# Patient Record
Sex: FEMALE | Race: WHITE | HISPANIC OR LATINO | Employment: UNEMPLOYED | URBAN - METROPOLITAN AREA
[De-identification: names, ages, dates, MRNs, and addresses within clinical notes are randomized per-mention and may not be internally consistent; named-entity substitution may affect disease eponyms.]

---

## 2024-06-28 ENCOUNTER — APPOINTMENT (OUTPATIENT)
Dept: ULTRASOUND IMAGING | Facility: HOSPITAL | Age: 39
End: 2024-06-28

## 2024-06-28 ENCOUNTER — HOSPITAL ENCOUNTER (EMERGENCY)
Facility: HOSPITAL | Age: 39
Discharge: HOME | End: 2024-06-28

## 2024-06-28 VITALS
BODY MASS INDEX: 31.41 KG/M2 | HEART RATE: 76 BPM | OXYGEN SATURATION: 97 % | SYSTOLIC BLOOD PRESSURE: 115 MMHG | RESPIRATION RATE: 18 BRPM | HEIGHT: 68 IN | TEMPERATURE: 97.7 F | DIASTOLIC BLOOD PRESSURE: 76 MMHG | WEIGHT: 207.23 LBS

## 2024-06-28 DIAGNOSIS — N93.9 VAGINAL BLEEDING: ICD-10-CM

## 2024-06-28 DIAGNOSIS — R73.9 HYPERGLYCEMIA: ICD-10-CM

## 2024-06-28 DIAGNOSIS — R10.9 ABDOMINAL CRAMPING: ICD-10-CM

## 2024-06-28 DIAGNOSIS — O03.9 MISCARRIAGE: Primary | ICD-10-CM

## 2024-06-28 LAB
ABO GROUP BLD: NORMAL
ALBUMIN SERPL-MCNC: 4 G/DL (ref 3.5–5.2)
ALBUMIN/GLOB SERPL: 1.3 G/DL
ALP SERPL-CCNC: 105 U/L (ref 39–117)
ALT SERPL W P-5'-P-CCNC: 19 U/L (ref 1–33)
ANION GAP SERPL CALCULATED.3IONS-SCNC: 11.1 MMOL/L (ref 5–15)
AST SERPL-CCNC: 24 U/L (ref 1–32)
BASOPHILS # BLD AUTO: 0.05 10*3/MM3 (ref 0–0.2)
BASOPHILS NFR BLD AUTO: 0.4 % (ref 0–1.5)
BILIRUB SERPL-MCNC: 0.2 MG/DL (ref 0–1.2)
BUN SERPL-MCNC: 10 MG/DL (ref 6–20)
BUN/CREAT SERPL: 14.1 (ref 7–25)
CALCIUM SPEC-SCNC: 9 MG/DL (ref 8.6–10.5)
CHLORIDE SERPL-SCNC: 102 MMOL/L (ref 98–107)
CO2 SERPL-SCNC: 21.9 MMOL/L (ref 22–29)
CREAT SERPL-MCNC: 0.71 MG/DL (ref 0.57–1)
DEPRECATED RDW RBC AUTO: 43.3 FL (ref 37–54)
EGFRCR SERPLBLD CKD-EPI 2021: 111.1 ML/MIN/1.73
EOSINOPHIL # BLD AUTO: 0.04 10*3/MM3 (ref 0–0.4)
EOSINOPHIL NFR BLD AUTO: 0.3 % (ref 0.3–6.2)
ERYTHROCYTE [DISTWIDTH] IN BLOOD BY AUTOMATED COUNT: 17.2 % (ref 12.3–15.4)
GLOBULIN UR ELPH-MCNC: 3.1 GM/DL
GLUCOSE SERPL-MCNC: 262 MG/DL (ref 65–99)
HCG INTACT+B SERPL-ACNC: 61.9 MIU/ML
HCT VFR BLD AUTO: 35.4 % (ref 34–46.6)
HGB BLD-MCNC: 10.4 G/DL (ref 12–15.9)
IMM GRANULOCYTES # BLD AUTO: 0.08 10*3/MM3 (ref 0–0.05)
IMM GRANULOCYTES NFR BLD AUTO: 0.6 % (ref 0–0.5)
LYMPHOCYTES # BLD AUTO: 2.85 10*3/MM3 (ref 0.7–3.1)
LYMPHOCYTES NFR BLD AUTO: 22.1 % (ref 19.6–45.3)
MCH RBC QN AUTO: 21.1 PG (ref 26.6–33)
MCHC RBC AUTO-ENTMCNC: 29.4 G/DL (ref 31.5–35.7)
MCV RBC AUTO: 71.7 FL (ref 79–97)
MONOCYTES # BLD AUTO: 0.65 10*3/MM3 (ref 0.1–0.9)
MONOCYTES NFR BLD AUTO: 5 % (ref 5–12)
NEUTROPHILS NFR BLD AUTO: 71.6 % (ref 42.7–76)
NEUTROPHILS NFR BLD AUTO: 9.25 10*3/MM3 (ref 1.7–7)
NRBC BLD AUTO-RTO: 0 /100 WBC (ref 0–0.2)
NUMBER OF DOSES: NORMAL
PLATELET # BLD AUTO: 283 10*3/MM3 (ref 140–450)
PMV BLD AUTO: 9.8 FL (ref 6–12)
POTASSIUM SERPL-SCNC: 4.1 MMOL/L (ref 3.5–5.2)
PROT SERPL-MCNC: 7.1 G/DL (ref 6–8.5)
RBC # BLD AUTO: 4.94 10*6/MM3 (ref 3.77–5.28)
RH BLD: POSITIVE
SODIUM SERPL-SCNC: 135 MMOL/L (ref 136–145)
WBC NRBC COR # BLD AUTO: 12.92 10*3/MM3 (ref 3.4–10.8)

## 2024-06-28 PROCEDURE — 84702 CHORIONIC GONADOTROPIN TEST: CPT

## 2024-06-28 PROCEDURE — 76801 OB US < 14 WKS SINGLE FETUS: CPT

## 2024-06-28 PROCEDURE — 86901 BLOOD TYPING SEROLOGIC RH(D): CPT

## 2024-06-28 PROCEDURE — 93976 VASCULAR STUDY: CPT

## 2024-06-28 PROCEDURE — 93005 ELECTROCARDIOGRAM TRACING: CPT

## 2024-06-28 PROCEDURE — 80053 COMPREHEN METABOLIC PANEL: CPT

## 2024-06-28 PROCEDURE — 85025 COMPLETE CBC W/AUTO DIFF WBC: CPT

## 2024-06-28 PROCEDURE — 76817 TRANSVAGINAL US OBSTETRIC: CPT

## 2024-06-28 PROCEDURE — 86900 BLOOD TYPING SEROLOGIC ABO: CPT

## 2024-06-28 PROCEDURE — 99284 EMERGENCY DEPT VISIT MOD MDM: CPT

## 2024-06-28 RX ORDER — SODIUM CHLORIDE 0.9 % (FLUSH) 0.9 %
10 SYRINGE (ML) INJECTION AS NEEDED
Status: DISCONTINUED | OUTPATIENT
Start: 2024-06-28 | End: 2024-06-29 | Stop reason: HOSPADM

## 2024-06-28 NOTE — ED PROVIDER NOTES
Subjective   History of Present Illness  39-year-old female presents to ED with complaints of vaginal bleeding since Monday, states she is pregnant and is about 8 weeks along.  Last menstrual period being 4/22/2024.  She has 2 other children, 20 and 18 years of age.  Denies any difficulty with those pregnancies, was vaginal births in [place].  Unsure what her blood type is.  Does not have an OB or primary care here as she just recently moved here 4 months ago.  Denies chest pain, shortness of breath, dizziness.        Review of Systems   Gastrointestinal:  Positive for abdominal pain.   Genitourinary:  Positive for vaginal bleeding.       No past medical history on file.    No Known Allergies    No past surgical history on file.    No family history on file.    Social History     Socioeconomic History    Marital status:            Objective   Physical Exam  Vitals and nursing note reviewed. Exam conducted with a chaperone present.   Constitutional:       Appearance: Normal appearance.   HENT:      Head: Normocephalic and atraumatic.   Eyes:      Extraocular Movements: Extraocular movements intact.      Conjunctiva/sclera: Conjunctivae normal.   Cardiovascular:      Rate and Rhythm: Normal rate and regular rhythm.      Pulses: Normal pulses.      Heart sounds: Normal heart sounds.   Pulmonary:      Effort: Pulmonary effort is normal.      Breath sounds: Normal breath sounds.   Abdominal:      General: Bowel sounds are normal. There is no distension.      Palpations: Abdomen is soft.      Tenderness: There is abdominal tenderness.   Genitourinary:     Comments: She was placed in the lithotomy position and external genitalia were found to have no lesions and no swelling.  Speculum exam shows extensive blood in the vaginal vault, cervix unable to be visualized at this time even after attempting to remove the blood with 4 x 4's.  The patient had no cervical motion tenderness.  Patient had no adnexal tenderness. the  "exam was performed with Jayne ROBERTS at bedside.  Musculoskeletal:         General: Normal range of motion.   Skin:     General: Skin is warm and dry.   Neurological:      Mental Status: She is alert and oriented to person, place, and time.   Psychiatric:         Mood and Affect: Mood normal.         Behavior: Behavior normal.         Thought Content: Thought content normal.         Judgment: Judgment normal.         Procedures      EKG independently interpreted by Dr. Rubio and reviewed by myself as sinus rhythm at a rate of 89 with no previous to compare to.           ED Course  ED Course as of 06/29/24 0154   Fri Jun 28, 2024 1947 Spoke to lab regarding recommending rhogam with pt being A+ blood type. They stated it was an incorrect result and she does not require rhogam at this time [KB]   2057 Spoke to Dr. Russell NICOLAS regarding ultrasound findings, with reading stating possible ectopic pregnancy.  Discussed patient presentation along with lab results with her.  She stated that patient would be okay to follow-up on outpatient basis due to this [KB]   2152 Reassessment and disposition discussed with patient using  140291 [KB]      ED Course User Index  [KB] Aurelia Tavares, SADIE      /76   Pulse 76   Temp 97.7 °F (36.5 °C)   Resp 18   Ht 173 cm (68.11\")   Wt 94 kg (207 lb 3.7 oz)   LMP 04/22/2024   SpO2 97%   BMI 31.41 kg/m²   Labs Reviewed   COMPREHENSIVE METABOLIC PANEL - Abnormal; Notable for the following components:       Result Value    Glucose 262 (*)     Sodium 135 (*)     CO2 21.9 (*)     All other components within normal limits    Narrative:     GFR Normal >60  Chronic Kidney Disease <60  Kidney Failure <15     CBC WITH AUTO DIFFERENTIAL - Abnormal; Notable for the following components:    WBC 12.92 (*)     Hemoglobin 10.4 (*)     MCV 71.7 (*)     MCH 21.1 (*)     MCHC 29.4 (*)     RDW 17.2 (*)     Immature Grans % 0.6 (*)     Neutrophils, Absolute 9.25 (*)     Immature " Grans, Absolute 0.08 (*)     All other components within normal limits   HCG, QUANTITATIVE, PREGNANCY    Narrative:     HCG Ranges by Gestational Age    Females - non-pregnant premenopausal   </= 1mIU/mL HCG  Females - postmenopausal               </= 7mIU/mL HCG    3 Weeks       5.4   -      72 mIU/mL  4 Weeks      10.2   -     708 mIU/mL  5 Weeks       217   -   8,245 mIU/mL  6 Weeks       152   -  32,177 mIU/mL  7 Weeks     4,059   - 153,767 mIU/mL  8 Weeks    31,366   - 149,094 mIU/mL  9 Weeks    59,109   - 135,901 mIU/mL  10 Weeks   44,186   - 170,409 mIU/mL  12 Weeks   27,107   - 201,615 mIU/mL  14 Weeks   24,302   -  93,646 mIU/mL  15 Weeks   12,540   -  69,747 mIU/mL  16 Weeks    8,904   -  55,332 mIU/mL  17 Weeks    8,240   -  51,793 mIU/mL  18 Weeks    9,649   -  55,271 mIU/mL      RHIG EVALUATION   DOSES OF RH IMMUNE GLOBULIN   CBC AND DIFFERENTIAL    Narrative:     The following orders were created for panel order CBC & Differential.  Procedure                               Abnormality         Status                     ---------                               -----------         ------                     CBC Auto Differential[[id number]]        Abnormal            Final result               Scan Slide[[id number]]                                                                    Please view results for these tests on the individual orders.     Medications - No data to display    US Ob Transvaginal    Result Date: 2024  Impression: 1. No sonographic evidence of an intrauterine pregnancy. No complex adnexal masses or fluid collections. Given the positive beta hCG level, ectopic pregnancy cannot be excluded and correlation with serial beta hCG levels is needed. 2. A 4.2 cm circumscribed uterine mass in the body of the uterus is compatible with a submucosal fibroid. Electronically Signed: Vitaliy Molina DO  2024 8:15 PM EDT  Workstation ID: [id number]    US Ob < 14 Weeks Single or First  Gestation    Result Date: 6/28/2024  Impression: 1. No sonographic evidence of an intrauterine pregnancy. No complex adnexal masses or fluid collections. Given the positive beta hCG level, ectopic pregnancy cannot be excluded and correlation with serial beta hCG levels is needed. 2. A 4.2 cm circumscribed uterine mass in the body of the uterus is compatible with a submucosal fibroid. Electronically Signed: Vitaliy MolinaDO  6/28/2024 8:15 PM EDT  Workstation ID: [id number]                                          Medical Decision Making  Patient was seen for the above complaints.  IV was established and blood work was obtained to assess hCG, possible RhoGAM, infection, anemia, electrolyte abnormalities.  Glucose 262, hCG 61.9, blood type a positive, hemoglobin 10.4, white blood cell count 12.9.  Patient does not have any infectious source at this time, no hemoglobin to compare to the patient has had vaginal bleeding for 4 days, RhoGAM not required at this time.  Pelvic exam was completed, did not complete wet prep swab as patient had significant bleeding in the vaginal canal without blood clots.  Pelvic and transvaginal ultrasound was obtained and independently interpreted by the radiologist as no sonographic evidence of intrauterine pregnancy, no complex adnexal masses or fluid collections, given the positive beta hCG level ectopic pregnancy cannot be excluded.  Along with a 4.2 cm circumscribed uterine mass in the body of the uterus which is compatible with a submucosal fibroid.  Do not believe this to be an ectopic pregnancy as patient does not have any severe abdominal pain vaginal pain on pelvic exam, nausea or vomiting. discussed this case and the ultrasound with Dr. Russell NICOLAS who stated that she believes this is a miscarriage, patient will be stable for outpatient follow-up.  Considered abdominal CT however on follow-up patient denies any abdominal pain.  Also considered UA for UTI however patient denies any  urinary symptoms at this time.  patient was assessed and reassessed using a .  On reassessment, she denies any abdominal pain or cramping, states that she is only used approximately 1 pad in the ER stay, and denies any dizziness.  Advised patient to follow-up with OB to establish care, given Dr. Aragon's information who is a Mexican speaker along with [facility].  Also given patient family connection portal number to establish with a new primary care provider as her glucose is elevated.  Advised to use Tylenol or ibuprofen as needed for the discomfort and continue to stay hydrated.  Discussed signs and symptoms of need to return to the ER.  Patient and family bedside verbalized understanding are agreeable with disposition at this time.    I discussed findings with patient who voices understanding of discharge instructions, signs and symptoms requiring return to ED; discharged improved and in stable condition with follow up for re-evaluation.  This document is intended for medical expert use only. Reading of this document by patients and/or patient's family without participating medical staff guidance may result in misinterpretation and unintended morbidity.  Any interpretation of such data is the responsibility of the patient and/or family member responsible for the patient in concert with their primary or specialist providers, not to be left for sources of online searches such as [employer], [employer] or similar queries. Relying on these approaches to knowledge may result in misinterpretation, misguided goals of care and even death should patients or family members try recommendations outside of the realm of professional medical care in a supervised inpatient environment.     Problems Addressed:  Abdominal cramping: complicated acute illness or injury  Hyperglycemia: complicated acute illness or injury  Miscarriage: complicated acute illness or injury  Vaginal bleeding: complicated acute illness  or injury    Amount and/or Complexity of Data Reviewed  Labs: ordered. Decision-making details documented in ED Course.  Radiology: ordered and independent interpretation performed. Decision-making details documented in ED Course.  ECG/medicine tests: ordered and independent interpretation performed. Decision-making details documented in ED Course.    Risk  OTC drugs.  Prescription drug management.        Final diagnoses:   Miscarriage   Vaginal bleeding   Abdominal cramping   Hyperglycemia       ED Disposition  ED Disposition       ED Disposition   Discharge    Condition   Stable    Comment   --               Carson Aragon MD  [address]  [place] [id number] [phone]    Schedule an appointment as soon as possible for a visit       family connections  [phone]  Schedule an appointment as soon as possible for a visit       June Rodriguez MD  [address] [facility] [address]  [place] [id number] [phone]    Schedule an appointment as soon as possible for a visit            Medication List      No changes were made to your prescriptions during this visit.            Aurelia Tavares, APRN  06/29/24 0154

## 2024-06-28 NOTE — ED NOTES
via iPad used during assessment and pelvic exam. Pt reports had positive pregnancy test at home on Monday, LMP was 4/22/24, pt reports brown vaginal discharge on Monday and vaginal bleeding today with low abdominal cramping. This is patients third pregnancy. Pt denies any fevers, n/v/d or dysuria, no dizziness.

## 2024-06-29 LAB
QT INTERVAL: 395 MS
QTC INTERVAL: 480 MS

## 2024-06-29 NOTE — DISCHARGE INSTRUCTIONS
Continue to stay hydrated and monitor how much you are bleeding.  Return with any large clots or dizziness.  May use Tylenol or ibuprofen as needed for discomfort.  No more than 4000 g of Tylenol in a 24-hour period.    Follow-up with OB using either numbers above, Dr. Aragon is a Tristanian-speaking OB.  Also use the number above to establish with a new primary care provider as your blood sugar was elevated today.    Return with any new or worsening symptoms

## 2024-10-31 ENCOUNTER — APPOINTMENT (OUTPATIENT)
Dept: ULTRASOUND IMAGING | Facility: HOSPITAL | Age: 39
End: 2024-10-31
Payer: MEDICAID

## 2024-10-31 ENCOUNTER — HOSPITAL ENCOUNTER (EMERGENCY)
Facility: HOSPITAL | Age: 39
Discharge: HOME | End: 2024-10-31
Attending: EMERGENCY MEDICINE
Payer: MEDICAID

## 2024-10-31 ENCOUNTER — TELEPHONE (OUTPATIENT)
Dept: OBSTETRICS AND GYNECOLOGY | Facility: CLINIC | Age: 39
End: 2024-10-31
Payer: MEDICAID

## 2024-10-31 VITALS
SYSTOLIC BLOOD PRESSURE: 118 MMHG | HEIGHT: 67 IN | RESPIRATION RATE: 16 BRPM | BODY MASS INDEX: 32.46 KG/M2 | HEART RATE: 77 BPM | OXYGEN SATURATION: 99 % | TEMPERATURE: 98.3 F | DIASTOLIC BLOOD PRESSURE: 69 MMHG | WEIGHT: 206.79 LBS

## 2024-10-31 DIAGNOSIS — O03.9 SPONTANEOUS ABORTION: Primary | ICD-10-CM

## 2024-10-31 LAB
ALBUMIN SERPL-MCNC: 4.3 G/DL (ref 3.5–5.2)
ALBUMIN/GLOB SERPL: 1.3 G/DL
ALP SERPL-CCNC: 125 U/L (ref 39–117)
ALT SERPL W P-5'-P-CCNC: 22 U/L (ref 1–33)
ANION GAP SERPL CALCULATED.3IONS-SCNC: 11.6 MMOL/L (ref 5–15)
AST SERPL-CCNC: 22 U/L (ref 1–32)
BACTERIA UR QL AUTO: ABNORMAL /HPF
BASOPHILS # BLD AUTO: 0.05 10*3/MM3 (ref 0–0.2)
BASOPHILS NFR BLD AUTO: 0.4 % (ref 0–1.5)
BILIRUB SERPL-MCNC: 0.4 MG/DL (ref 0–1.2)
BILIRUB UR QL STRIP: NEGATIVE
BUN SERPL-MCNC: 6 MG/DL (ref 6–20)
BUN/CREAT SERPL: 8.7 (ref 7–25)
CALCIUM SPEC-SCNC: 9.3 MG/DL (ref 8.6–10.5)
CHLORIDE SERPL-SCNC: 100 MMOL/L (ref 98–107)
CLARITY UR: CLEAR
CO2 SERPL-SCNC: 24.4 MMOL/L (ref 22–29)
COLOR UR: YELLOW
CREAT SERPL-MCNC: 0.69 MG/DL (ref 0.57–1)
DEPRECATED RDW RBC AUTO: 44 FL (ref 37–54)
EGFRCR SERPLBLD CKD-EPI 2021: 113.4 ML/MIN/1.73
EOSINOPHIL # BLD AUTO: 0.02 10*3/MM3 (ref 0–0.4)
EOSINOPHIL NFR BLD AUTO: 0.2 % (ref 0.3–6.2)
ERYTHROCYTE [DISTWIDTH] IN BLOOD BY AUTOMATED COUNT: 17.8 % (ref 12.3–15.4)
GLOBULIN UR ELPH-MCNC: 3.4 GM/DL
GLUCOSE SERPL-MCNC: 321 MG/DL (ref 65–99)
GLUCOSE UR STRIP-MCNC: ABNORMAL MG/DL
HCG INTACT+B SERPL-ACNC: 11.6 MIU/ML
HCT VFR BLD AUTO: 42.6 % (ref 34–46.6)
HGB BLD-MCNC: 12.4 G/DL (ref 12–15.9)
HGB UR QL STRIP.AUTO: ABNORMAL
HYALINE CASTS UR QL AUTO: ABNORMAL /LPF
IMM GRANULOCYTES # BLD AUTO: 0.06 10*3/MM3 (ref 0–0.05)
IMM GRANULOCYTES NFR BLD AUTO: 0.5 % (ref 0–0.5)
KETONES UR QL STRIP: ABNORMAL
LEUKOCYTE ESTERASE UR QL STRIP.AUTO: NEGATIVE
LYMPHOCYTES # BLD AUTO: 2.78 10*3/MM3 (ref 0.7–3.1)
LYMPHOCYTES NFR BLD AUTO: 21.8 % (ref 19.6–45.3)
MCH RBC QN AUTO: 21 PG (ref 26.6–33)
MCHC RBC AUTO-ENTMCNC: 29.1 G/DL (ref 31.5–35.7)
MCV RBC AUTO: 72.2 FL (ref 79–97)
MONOCYTES # BLD AUTO: 0.51 10*3/MM3 (ref 0.1–0.9)
MONOCYTES NFR BLD AUTO: 4 % (ref 5–12)
NEUTROPHILS NFR BLD AUTO: 73.1 % (ref 42.7–76)
NEUTROPHILS NFR BLD AUTO: 9.36 10*3/MM3 (ref 1.7–7)
NITRITE UR QL STRIP: NEGATIVE
NRBC BLD AUTO-RTO: 0 /100 WBC (ref 0–0.2)
PH UR STRIP.AUTO: 5.5 [PH] (ref 5–8)
PLATELET # BLD AUTO: 318 10*3/MM3 (ref 140–450)
PMV BLD AUTO: 10.1 FL (ref 6–12)
POTASSIUM SERPL-SCNC: 4.4 MMOL/L (ref 3.5–5.2)
PROT SERPL-MCNC: 7.7 G/DL (ref 6–8.5)
PROT UR QL STRIP: ABNORMAL
RBC # BLD AUTO: 5.9 10*6/MM3 (ref 3.77–5.28)
RBC # UR STRIP: ABNORMAL /HPF
REF LAB TEST METHOD: ABNORMAL
SODIUM SERPL-SCNC: 136 MMOL/L (ref 136–145)
SP GR UR STRIP: 1.05 (ref 1–1.03)
SQUAMOUS #/AREA URNS HPF: ABNORMAL /HPF
UROBILINOGEN UR QL STRIP: ABNORMAL
WBC # UR STRIP: ABNORMAL /HPF
WBC NRBC COR # BLD AUTO: 12.78 10*3/MM3 (ref 3.4–10.8)

## 2024-10-31 PROCEDURE — 84702 CHORIONIC GONADOTROPIN TEST: CPT | Performed by: EMERGENCY MEDICINE

## 2024-10-31 PROCEDURE — 81001 URINALYSIS AUTO W/SCOPE: CPT | Performed by: EMERGENCY MEDICINE

## 2024-10-31 PROCEDURE — 99284 EMERGENCY DEPT VISIT MOD MDM: CPT

## 2024-10-31 PROCEDURE — 76801 OB US < 14 WKS SINGLE FETUS: CPT

## 2024-10-31 PROCEDURE — 93976 VASCULAR STUDY: CPT

## 2024-10-31 PROCEDURE — 85025 COMPLETE CBC W/AUTO DIFF WBC: CPT | Performed by: EMERGENCY MEDICINE

## 2024-10-31 PROCEDURE — 76817 TRANSVAGINAL US OBSTETRIC: CPT

## 2024-10-31 PROCEDURE — 80053 COMPREHEN METABOLIC PANEL: CPT | Performed by: EMERGENCY MEDICINE

## 2024-10-31 RX ORDER — SODIUM CHLORIDE 0.9 % (FLUSH) 0.9 %
10 SYRINGE (ML) INJECTION AS NEEDED
Status: DISCONTINUED | OUTPATIENT
Start: 2024-10-31 | End: 2024-10-31 | Stop reason: HOSPADM

## 2024-10-31 NOTE — TELEPHONE ENCOUNTER
Rcv'd incoming referral from Dr. Ghulam Painter at  ED, for spontaneous miscarriage.  Pt was seen there today, had HCG level of 11.60, US impression is;  No IUP identified. Irregular endometrial thickening is present. This may relate to spontaneous  with retained products of conception. Correlation with serum beta hCG is recommended.  Small uterine fibroid.      I scheduled pt in first available [facility] appt on 24 w/Dr. Hartman, but I believe pt should be seen sooner.  May I add pt to your schedule at 1:00 pm at [facility] tomorrow?       Thanks,   Charlene

## 2024-10-31 NOTE — ED PROVIDER NOTES
"Subjective   History of Present Illness  39-year-old G4 last menstrual period started on 2024 presents for vaginal bleeding.  Started around 6 AM this morning.  Is like the first day of her period.  No discharge.  States this is her fourth pregnancy.  States she has some lower abdominal cramping.  No fevers.  States she had a miscarriage just a few months ago.  Does not follow-up with a specific OB/GYN.  Review of Systems  See HPI.  No past medical history on file.    No Known Allergies    No past surgical history on file.    No family history on file.    Social History     Socioeconomic History    Marital status:            Objective   Physical Exam  No acute distress, regular rate and rhythm, intact distal pulses, elevated BMI, abdomen soft mild bilateral lower quadrant tenderness palpation without rebound or guarding, normal conjunctiva, nontoxic-appearing  Procedures           ED Course      /69 (BP Location: Right arm, Patient Position: Lying)   Pulse 77   Temp 98.3 °F (36.8 °C) (Oral)   Resp 16   Ht 170 cm (66.93\")   Wt 93.8 kg (206 lb 12.7 oz)   LMP 2024 Comment:  A1  SpO2 99%   BMI 32.46 kg/m²   Labs Reviewed   COMPREHENSIVE METABOLIC PANEL - Abnormal; Notable for the following components:       Result Value    Glucose 321 (*)     Alkaline Phosphatase 125 (*)     All other components within normal limits    Narrative:     GFR Normal >60  Chronic Kidney Disease <60  Kidney Failure <15     URINALYSIS W/ MICROSCOPIC IF INDICATED (NO CULTURE) - Abnormal; Notable for the following components:    Specific Gravity, UA 1.050 (*)     Glucose, UA >=1000 mg/dL (3+) (*)     Ketones, UA Trace (*)     Blood, UA Large (3+) (*)     Protein, UA 30 mg/dL (1+) (*)     All other components within normal limits   CBC WITH AUTO DIFFERENTIAL - Abnormal; Notable for the following components:    WBC 12.78 (*)     RBC 5.90 (*)     MCV 72.2 (*)     MCH 21.0 (*)     MCHC 29.1 (*)     RDW " 17.8 (*)     Monocyte % 4.0 (*)     Eosinophil % 0.2 (*)     Neutrophils, Absolute 9.36 (*)     Immature Grans, Absolute 0.06 (*)     All other components within normal limits   URINALYSIS, MICROSCOPIC ONLY - Abnormal; Notable for the following components:    RBC, UA 6-10 (*)     All other components within normal limits   HCG, QUANTITATIVE, PREGNANCY    Narrative:     HCG Ranges by Gestational Age    Females - non-pregnant premenopausal   </= 1mIU/mL HCG  Females - postmenopausal               </= 7mIU/mL HCG    3 Weeks       5.4   -      72 mIU/mL  4 Weeks      10.2   -     708 mIU/mL  5 Weeks       217   -   8,245 mIU/mL  6 Weeks       152   -  32,177 mIU/mL  7 Weeks     4,059   - 153,767 mIU/mL  8 Weeks    31,366   - 149,094 mIU/mL  9 Weeks    59,109   - 135,901 mIU/mL  10 Weeks   44,186   - 170,409 mIU/mL  12 Weeks   27,107   - 201,615 mIU/mL  14 Weeks   24,302   -  93,646 mIU/mL  15 Weeks   12,540   -  69,747 mIU/mL  16 Weeks    8,904   -  55,332 mIU/mL  17 Weeks    8,240   -  51,793 mIU/mL  18 Weeks    9,649   -  55,271 mIU/mL     CBC AND DIFFERENTIAL    Narrative:     The following orders were created for panel order CBC & Differential.  Procedure                               Abnormality         Status                     ---------                               -----------         ------                     CBC Auto Differential[[id number]]        Abnormal            Final result               Scan Slide[[id number]]                                                                    Please view results for these tests on the individual orders.     .ED  US Ob Transvaginal   Final Result   Impression:   No IUP identified. Irregular endometrial thickening is present. This may relate to spontaneous  with retained products of conception. Correlation with serum beta hCG is recommended.      Small uterine fibroid.            Electronically Signed: Malissa Aj MD     10/31/2024 11:53 AM EDT     Workstation  ID: [id number]      US Ob < 14 Weeks Single or First Gestation   Final Result   Impression:   No IUP identified. Irregular endometrial thickening is present. This may relate to spontaneous  with retained products of conception. Correlation with serum beta hCG is recommended.      Small uterine fibroid.            Electronically Signed: Malissa Aj MD     10/31/2024 11:53 AM EDT     Workstation ID: [id number]                                                 Medical Decision Making  Ultrasound consistent with miscarriage still in process.  Patient with reassuring labs and exam.  Discussed importance of repeat hCG and possible ultrasound.  DC home.  Return ER precautions discussed.    Final diagnoses:   Spontaneous        ED Disposition  ED Disposition       ED Disposition   Discharge    Condition   Stable    Comment   --               Katerina Hansen MD  [address] [facility] [place] [address]  [phone]    In 2 days  for repeat hcg check         Medication List      No changes were made to your prescriptions during this visit.            Ghulam Painter MD  24 0040

## 2024-11-01 NOTE — TELEPHONE ENCOUNTER
Tried to reach pt twice this morning, via , to offer appt TODAY, w/Dr. Starks, pt did not answer and did not return our call.     Please confirm if she can see Dr. Soy Hartman at our [facility] office on 11/13/24 at 11:00 am.  That is our first available [facility] appt.    Pt # [phone]

## 2024-11-13 ENCOUNTER — OFFICE VISIT (OUTPATIENT)
Dept: OBSTETRICS AND GYNECOLOGY | Facility: CLINIC | Age: 39
End: 2024-11-13
Payer: MEDICAID

## 2024-11-13 VITALS
HEART RATE: 96 BPM | BODY MASS INDEX: 32.21 KG/M2 | SYSTOLIC BLOOD PRESSURE: 128 MMHG | DIASTOLIC BLOOD PRESSURE: 88 MMHG | HEIGHT: 67 IN | WEIGHT: 205.2 LBS

## 2024-11-13 DIAGNOSIS — O03.9 COMPLETE ABORTION: ICD-10-CM

## 2024-11-13 DIAGNOSIS — R73.09 ELEVATED GLUCOSE: Primary | ICD-10-CM

## 2024-11-13 DIAGNOSIS — E11.9 TYPE 2 DIABETES MELLITUS WITHOUT COMPLICATION, WITHOUT LONG-TERM CURRENT USE OF INSULIN: ICD-10-CM

## 2024-11-13 RX ORDER — PRENATAL VIT/IRON FUM/FOLIC AC 27MG-0.8MG
TABLET ORAL DAILY
COMMUNITY

## 2024-11-13 NOTE — PROGRESS NOTES
"        REASON FOR FOLLOWUP/CHIEF COMPLAINT: Follow-up spontaneous miscarriage     HISTORY OF PRESENT ILLNESS: Patient is  4 para 2-0-2-2 seen today with   and seen with her significant other.  She was seen recently in the emergency room with vaginal bleeding on 10/21/2024 and findings at that time showed her hCG had declined to 11 from 60 and the ultrasound showed no evidence of intrauterine pregnancy.  She states that she had a similar episode in 2024.  Reviewed her medical history she states that she was told to follow-up regarding her glucose level upon review of the medical records these were quite elevated.  She had a glucose of 378 in  and glucose level of 321 on 10/31/2024.  She does not have a primary care provider.  She denies any complaints today and states that her bleeding has stopped.  She does wish to conceive and does not want to take birth control at this time.    I spent time talking to her about the glucose levels.  She is clearly a type II diabetic that has not been diagnosed nor treated.  Explained to her that poor glucose control will have negative effects on her overall health and certainly the ability to conceive and to stay pregnant.  Also discussed risk of anatomic anomalies.      Past Medical History, Past Surgical History, Social History, Family History have been reviewed and are without significant changes except as mentioned.    Review of Systems   Review of Systems   Constitutional:  Negative for fatigue and fever.   Genitourinary:  Negative for pelvic pain and vaginal bleeding.       Medications:  The current medication list was reviewed in the EMR    ALLERGIES:  No Known Allergies           Vitals:    24 1112   BP: 128/88   Pulse: 96   Weight: 93.1 kg (205 lb 3.2 oz)   Height: 170 cm (66.93\")     Physical Exam    CONSTITUTIONAL:  Vital signs reviewed.  No distress, looks comfortable.  EYES:  Conjunctiva and lids unremarkable.  " PERRLA  EARS,NOSE,MOUTH,THROAT:  Ears and nose appear unremarkable.  Lips, teeth, gums appear unremarkable.  RESPIRATORY:  Normal respiratory effort.  Lungs clear to auscultation bilaterally.  CARDIOVASCULAR:  Normal S1, S2.  No murmurs rubs or gallops.  No significant lower extremity edema.  GASTROINTESTINAL: Abdomen appears unremarkable.  Nontender.  No hepatomegaly.  No splenomegaly.  NEURO: cranial nerves 2-12 grossly intact.  No focal deficits.  Appears to have equal strength all 4 extremities.  MUSCULOSKELETAL:  Unremarkable digits/nails.  No cyanosis or clubbing.  SKIN:  Warm.  No rashes.  PSYCHIATRIC:  Normal judgment and insight.  Normal mood and affect.       RECENT LABS:  WBC   Date Value Ref Range Status   10/31/2024 12.78 (H) 3.40 - 10.80 10*3/mm3 Final   06/28/2024 12.92 (H) 3.40 - 10.80 10*3/mm3 Final     Hemoglobin   Date Value Ref Range Status   10/31/2024 12.4 12.0 - 15.9 g/dL Final   06/28/2024 10.4 (L) 12.0 - 15.9 g/dL Final     Platelets   Date Value Ref Range Status   10/31/2024 318 140 - 450 10*3/mm3 Final   06/28/2024 283 140 - 450 10*3/mm3 Final       ASSESSMENT/PLAN:  Ellie Piedra [unfilled]   1.  Completed spontaneous miscarriage by history.  Will get quantitative hCG today to confirm.  2.  Untreated type 2 diabetes.  Got hemoglobin A1c today and made referral to primary care provider to begin treatment.  Plan: As above, also I strongly recommended to the patient and her  that she not become pregnant until she has been thoroughly evaluated and treated for the diabetes.  Will follow-up here as needed.

## 2024-11-14 LAB
HBA1C MFR BLD: 13.1 % (ref 4.8–5.6)
HCG INTACT+B SERPL-ACNC: <1 MIU/ML

## 2024-11-14 NOTE — PROGRESS NOTES
Notify patient that her hCG is now negative.  Her hemoglobin A1c is elevated consistent with her prior glucose levels and will be addressed when she makes appointment with primary care provider.

## 2024-11-18 ENCOUNTER — OFFICE VISIT (OUTPATIENT)
Dept: FAMILY MEDICINE CLINIC | Facility: CLINIC | Age: 39
End: 2024-11-18
Payer: MEDICAID

## 2024-11-18 VITALS
DIASTOLIC BLOOD PRESSURE: 64 MMHG | BODY MASS INDEX: 32.39 KG/M2 | HEIGHT: 67 IN | OXYGEN SATURATION: 100 % | HEART RATE: 85 BPM | WEIGHT: 206.4 LBS | SYSTOLIC BLOOD PRESSURE: 118 MMHG

## 2024-11-18 DIAGNOSIS — Z83.3 FAMILY HISTORY OF DIABETES MELLITUS TYPE II: Primary | ICD-10-CM

## 2024-11-18 DIAGNOSIS — Z13.220 LIPID SCREENING: ICD-10-CM

## 2024-11-18 PROCEDURE — 3046F HEMOGLOBIN A1C LEVEL >9.0%: CPT | Performed by: NURSE PRACTITIONER

## 2024-11-18 PROCEDURE — 99204 OFFICE O/P NEW MOD 45 MIN: CPT | Performed by: NURSE PRACTITIONER

## 2024-11-18 NOTE — PROGRESS NOTES
Subjective     Ellie Piedra is a 39 y.o. female. Presents today for   Chief Complaint   Patient presents with    Annual Exam       New patient here to establish care, receive an annual physical    , Indonesian was utilized for this visit via [employer].    History of Present Illness  The patient presents for evaluation of high blood sugar. She is accompanied by a .    She reports that her blood sugar levels have been elevated on two separate occasions. She has no history of heart or lung conditions, nor any thyroid issues. She experienced a miscarriage and at that time, her blood sugar level was recorded at 320. Her doctor suggested that this could be a contributing factor to her difficulty in maintaining pregnancies. She has not undergone an eye examination in the past year.    FAMILY HISTORY  She has a family history of diabetes on her father's side.    IMMUNIZATIONS  She is up to date on her vaccinations, which she received in [place].         There is no problem list on file for this patient.    No past medical history on file.  History reviewed. No pertinent surgical history.  Family History   Problem Relation Age of Onset    No Known Problems Mother     Diabetes Father      Social History     Socioeconomic History    Marital status:    Tobacco Use    Smoking status: Never    Smokeless tobacco: Never   Vaping Use    Vaping status: Never Used   Substance and Sexual Activity    Alcohol use: Never    Drug use: Defer    Sexual activity: Defer       No Known Allergies    Current Outpatient Medications on File Prior to Visit   Medication Sig Dispense Refill    Prenatal Vit-Fe Fumarate-FA (prenatal vitamin 27-0.8) 27-0.8 MG tablet tablet Take  by mouth Daily. (Patient not taking: Reported on 11/18/2024)       No current facility-administered medications on file prior to visit.       Objective   Vitals:    11/18/24 0829   BP: 118/64   Pulse: 85   SpO2: 100%   Weight: 93.6 kg (206 lb 6.4 oz)  "  Height: 170 cm (66.93\")     Body mass index is 32.39 kg/m².  Physical Exam  Constitutional:       Appearance: She is obese.   HENT:      Head: Normocephalic and atraumatic.      Nose: Nose normal.      Mouth/Throat:      Mouth: Mucous membranes are moist.   Eyes:      Pupils: Pupils are equal, round, and reactive to light.   Cardiovascular:      Rate and Rhythm: Normal rate and regular rhythm.      Pulses: Normal pulses.      Heart sounds: Normal heart sounds.   Pulmonary:      Effort: Pulmonary effort is normal.      Breath sounds: Normal breath sounds.   Abdominal:      General: Bowel sounds are normal.   Musculoskeletal:         General: Normal range of motion.      Cervical back: Normal range of motion.   Skin:     General: Skin is warm and dry.      Capillary Refill: Capillary refill takes less than 2 seconds.   Neurological:      General: No focal deficit present.      Mental Status: She is alert.   Psychiatric:         Mood and Affect: Mood normal.       Physical Exam      Results         Procedures     Assessment & Plan   Diagnoses and all orders for this visit:    1. Family history of diabetes mellitus type II (Primary)  -     Hemoglobin A1c  -     CBC & Differential  -     Comprehensive Metabolic Panel  -     Microalbumin / Creatinine Urine Ratio - Urine, Clean Catch    2. Lipid screening  -     Lipid Panel         Assessment & Plan  1. Diabetes Mellitus.  She reports having high blood sugar levels, with a recent reading of 320. There is a family history of diabetes on her father's side. A complete blood cell count, metabolic panel, hemoglobin A1c, and urinalysis will be conducted today to establish a baseline. The results are expected tomorrow. She is advised to undergo annual eye examinations due to the impact of diabetes on small blood vessels, including those in the eyes and uterus.    Follow-up  Patient is scheduled for a follow-up visit on Wednesday to discuss the lab results.        BMI is >= 30 " and <35. (Class 1 Obesity). The following options were offered after discussion;: weight loss educational material (shared in after visit summary), exercise counseling/recommendations, nutrition counseling/recommendations, and pharmacological intervention options     No follow-ups on file.     Patient or patient representative verbalized consent for the use of Ambient Listening during the visit with  SADIE Chavis for chart documentation. 11/18/2024  10:12 EST

## 2024-11-19 LAB
ALBUMIN SERPL-MCNC: 4.4 G/DL (ref 3.5–5.2)
ALBUMIN/CREAT UR: 59 MG/G CREAT (ref 0–29)
ALBUMIN/GLOB SERPL: 1.4 G/DL
ALP SERPL-CCNC: 123 U/L (ref 39–117)
ALT SERPL-CCNC: 27 U/L (ref 1–33)
AST SERPL-CCNC: 21 U/L (ref 1–32)
BASOPHILS # BLD MANUAL: 0 10*3/MM3 (ref 0–0.2)
BASOPHILS NFR BLD MANUAL: 0 % (ref 0–1.5)
BILIRUB SERPL-MCNC: 0.3 MG/DL (ref 0–1.2)
BUN SERPL-MCNC: 8 MG/DL (ref 6–20)
BUN/CREAT SERPL: 14.3 (ref 7–25)
CALCIUM SERPL-MCNC: 9.4 MG/DL (ref 8.6–10.5)
CHLORIDE SERPL-SCNC: 99 MMOL/L (ref 98–107)
CHOLEST SERPL-MCNC: 181 MG/DL (ref 0–200)
CO2 SERPL-SCNC: 24.5 MMOL/L (ref 22–29)
CREAT SERPL-MCNC: 0.56 MG/DL (ref 0.57–1)
CREAT UR-MCNC: 81.9 MG/DL
DIFFERENTIAL COMMENT: ABNORMAL
EGFRCR SERPLBLD CKD-EPI 2021: 119.2 ML/MIN/1.73
EOSINOPHIL # BLD MANUAL: 0 10*3/MM3 (ref 0–0.4)
EOSINOPHIL NFR BLD MANUAL: 0 % (ref 0.3–6.2)
ERYTHROCYTE [DISTWIDTH] IN BLOOD BY AUTOMATED COUNT: 18.2 % (ref 12.3–15.4)
GLOBULIN SER CALC-MCNC: 3.2 GM/DL
GLUCOSE SERPL-MCNC: 312 MG/DL (ref 65–99)
HBA1C MFR BLD: 11.4 % (ref 4.8–5.6)
HCT VFR BLD AUTO: 42.1 % (ref 34–46.6)
HDLC SERPL-MCNC: 27 MG/DL (ref 40–60)
HGB BLD-MCNC: 12.7 G/DL (ref 12–15.9)
LDLC SERPL CALC-MCNC: 44 MG/DL (ref 0–100)
LYMPHOCYTES # BLD MANUAL: 2.99 10*3/MM3 (ref 0.7–3.1)
LYMPHOCYTES NFR BLD MANUAL: 27 % (ref 19.6–45.3)
MCH RBC QN AUTO: 22.1 PG (ref 26.6–33)
MCHC RBC AUTO-ENTMCNC: 30.2 G/DL (ref 31.5–35.7)
MCV RBC AUTO: 73.3 FL (ref 79–97)
MICROALBUMIN UR-MCNC: 48.3 UG/ML
MONOCYTES # BLD MANUAL: 0.33 10*3/MM3 (ref 0.1–0.9)
MONOCYTES NFR BLD MANUAL: 3 % (ref 5–12)
NEUTROPHILS # BLD MANUAL: 7.75 10*3/MM3 (ref 1.7–7)
NEUTROPHILS NFR BLD MANUAL: 70 % (ref 42.7–76)
NRBC BLD AUTO-RTO: 0 /100 WBC (ref 0–0.2)
PLATELET # BLD AUTO: 331 10*3/MM3 (ref 140–450)
PLATELET BLD QL SMEAR: ABNORMAL
POTASSIUM SERPL-SCNC: 4.7 MMOL/L (ref 3.5–5.2)
PROT SERPL-MCNC: 7.6 G/DL (ref 6–8.5)
RBC # BLD AUTO: 5.74 10*6/MM3 (ref 3.77–5.28)
RBC MORPH BLD: ABNORMAL
SODIUM SERPL-SCNC: 135 MMOL/L (ref 136–145)
TRIGL SERPL-MCNC: 782 MG/DL (ref 0–150)
VLDLC SERPL CALC-MCNC: 110 MG/DL (ref 5–40)
WBC # BLD AUTO: 11.07 10*3/MM3 (ref 3.4–10.8)

## 2024-11-19 NOTE — PROGRESS NOTES
Abebe hemoglobina A1c ha vik de 13.1 a 11.4.  Abebe orina moderada con microalbúmina aumenta ligeramente a los 59.  Abebe recuento de glóbulos blancos está elevado a 11, esto puede deberse a tory serie de razones, puede no ser necesariamente tory infección.  Abebe glucosa en ayunas es de 312, que es 3 veces más alok de lo que debería ser.  Abebe sodio es un poco bajo, agregue solo tory pequeña cantidad de sal a abebe dieta.  Abebe fosfatasa alcalina ha disminuido de 125 a 123, continuaremos monitoreando esto.  Suzy triglicéridos son 782, abebe colesterol HDL es bajo a 27 y abebe colesterol VLDL aumenta a 110.  Para mejorar estos, necesitaría hacer ejercicio 45 minutos al día, 5 días a la semana, lo que puede ser tan simple tanja caminar.  Controle abebe dieta, limite los carbohidratos simples y shanta tanto ejercicio tanja sea posible.  Voy a enviarle tory receta de [employer] si abebe seguro lo cubre.

## 2024-11-19 NOTE — PROGRESS NOTES
"Subjective   Ellie Piedra is a 39 y.o. female. Presents today for   Chief Complaint   Patient presents with    Results       History Of Present Illness  Ellie presents today to go over her lab results.   Service used for this visit.  Patient and spouse in attendance.    History of Present Illness  The patient presents for evaluation of lab results. She is accompanied by a  and her spouse.    She has been diagnosed with diabetes and is currently on medication for the same. Additionally, she is taking fenofibrate to manage her triglyceride levels.    There is no problem list on file for this patient.      Social History     Socioeconomic History    Marital status:    Tobacco Use    Smoking status: Never    Smokeless tobacco: Never   Vaping Use    Vaping status: Never Used   Substance and Sexual Activity    Alcohol use: Never    Drug use: Defer    Sexual activity: Defer       No Known Allergies    Current Outpatient Medications on File Prior to Visit   Medication Sig Dispense Refill    Prenatal Vit-Fe Fumarate-FA (prenatal vitamin 27-0.8) 27-0.8 MG tablet tablet Take  by mouth Daily. (Patient not taking: Reported on 11/20/2024)       No current facility-administered medications on file prior to visit.       Objective   Vitals:    11/20/24 0920   BP: 106/70   Pulse: 76   SpO2: 97%   Weight: 93.7 kg (206 lb 9.6 oz)   Height: 170 cm (66.93\")     Body mass index is 32.43 kg/m².    Physical Exam      Results  Laboratory Studies  White blood cells were slightly elevated. Fasting glucose was 312. Alkaline phosphatase level was slightly elevated. Hemoglobin A1c was 11.4. Triglycerides were 782. High density lipids were slightly low. Microalbumin creatinine ratio was slightly high.    Lab Results   Component Value Date    WBC 11.07 (H) 11/18/2024    HGB 12.7 11/18/2024    HCT 42.1 11/18/2024    MCV 73.3 (L) 11/18/2024     11/18/2024     Lab Results   Component Value Date    GLUCOSE 312 " (H) 11/18/2024    BUN 8 11/18/2024    CREATININE 0.56 (L) 11/18/2024     (L) 11/18/2024    K 4.7 11/18/2024    CL 99 11/18/2024    CALCIUM 9.4 11/18/2024    PROTEINTOT 7.7 10/31/2024    ALBUMIN 4.4 11/18/2024    ALT 27 11/18/2024    AST 21 11/18/2024    ALKPHOS 123 (H) 11/18/2024    BILITOT 0.3 11/18/2024    GLOB 3.4 10/31/2024    AGRATIO 1.3 10/31/2024    BCR 14.3 11/18/2024    ANIONGAP 11.6 10/31/2024    EGFR 113.4 10/31/2024     Lab Results   Component Value Date    HGBA1C 11.40 (H) 11/18/2024     Lab Results   Component Value Date    CHLPL 181 11/18/2024    TRIG 782 (H) 11/18/2024    HDL 27 (L) 11/18/2024    LDL 44 11/18/2024     Component  Ref Range & Units 2 d ago   Creatinine, Urine  Not Estab. mg/dL 81.9   Microalbumin, Urine  Not Estab. ug/mL 48.3   Microalbumin/Creatinine Ratio  0 - 29 mg/g creat 59 High    Comment:                        Normal:                0 -  29                         Moderately increased: 30 - 300                         Severely increased:       >300          Procedures     Assessment & Plan   Diagnoses and all orders for this visit:    1. Type 2 diabetes mellitus with hyperglycemia, without long-term current use of insulin (Primary)  -     metFORMIN (GLUCOPHAGE) 500 MG tablet; Take 1 tablet by mouth 2 (Two) Times a Day With Meals.  Dispense: 60 tablet; Refill: 3    2. Hypertriglyceridemia  -     fenofibrate (TRICOR) 48 MG tablet; Take 1 tablet by mouth daily for high triglycerides  Dispense: 90 tablet; Refill: 3         Assessment & Plan  1. Elevated white blood cells.  Her complete blood count revealed a slight elevation in white blood cells, which could be attributed to a cold or viral exposure. This is not a significant concern at the moment.    2. Diabetes Mellitus.  The metabolic panel showed a fasting glucose level of 312, significantly above the normal limit of 100. Her hemoglobin A1c was 11.4, confirming a diabetic condition. Metformin was prescribed to help lower  her glucose levels, to be taken twice daily with meals. She was informed about the potential side effect of yeast infection due to increased glucose excretion in urine, and that treatment would be provided if this occurs.    3. Elevated alkaline phosphatase.  Her alkaline phosphatase level was mildly elevated, indicating potential liver issues. This will be monitored.    4. Hypertriglyceridemia.  Cholesterol levels were concerning, with triglycerides at 782, three times the normal limit, and slightly low high-density lipids. She was advised to manage her cholesterol through dietary changes and regular exercise. Specifically, she should limit beef, fatty foods, and carbohydrates, and aim for 45 minutes of exercise five days a week.    5. Elevated microalbumin creatinine ratio.  Her microalbumin creatinine ratio was elevated, likely due to uncontrolled diabetes. This will improve as her diabetes is managed.    Follow-up  Patient is scheduled for a follow-up visit in 3 months.          Body mass index is 32.43 kg/m².    Discussed Care Gaps, ordered referrals and encouraged vaccination updates.       - Pt agrees with plan of care and denies further questions/concerns today  - This document is intended for medical expert use only. Persons  reading this document without medical staff guidance may result in misinterpretation and unintended morbidity     Go to the ER for any possible life-threatening symptoms such as chest pain or shortness of air.      Please allow 3-5 business days for recommendations based on new results      I personally spent time with this patient, preparing for the visit, reviewing tests, obtaining and/or reviewing a separately obtained history, performing a medically appropriate examination and/or evaluation, counseling and educating the patient/family/caregiver, ordering medications,  documenting information in the medical record and indepentently interpreting results.               Return in about  3 months (around 2/20/2025).     Patient or patient representative verbalized consent for the use of Ambient Listening during the visit with  SADIE Chavis for chart documentation. 11/20/2024  10:18 EST

## 2024-11-20 ENCOUNTER — OFFICE VISIT (OUTPATIENT)
Dept: FAMILY MEDICINE CLINIC | Facility: CLINIC | Age: 39
End: 2024-11-20
Payer: MEDICAID

## 2024-11-20 VITALS
SYSTOLIC BLOOD PRESSURE: 106 MMHG | DIASTOLIC BLOOD PRESSURE: 70 MMHG | BODY MASS INDEX: 32.43 KG/M2 | HEART RATE: 76 BPM | OXYGEN SATURATION: 97 % | HEIGHT: 67 IN | WEIGHT: 206.6 LBS

## 2024-11-20 DIAGNOSIS — E11.65 TYPE 2 DIABETES MELLITUS WITH HYPERGLYCEMIA, WITHOUT LONG-TERM CURRENT USE OF INSULIN: Primary | ICD-10-CM

## 2024-11-20 DIAGNOSIS — E78.1 HYPERTRIGLYCERIDEMIA: ICD-10-CM

## 2024-11-20 PROCEDURE — 1160F RVW MEDS BY RX/DR IN RCRD: CPT | Performed by: NURSE PRACTITIONER

## 2024-11-20 PROCEDURE — 1159F MED LIST DOCD IN RCRD: CPT | Performed by: NURSE PRACTITIONER

## 2024-11-20 PROCEDURE — 3046F HEMOGLOBIN A1C LEVEL >9.0%: CPT | Performed by: NURSE PRACTITIONER

## 2024-11-20 PROCEDURE — 99213 OFFICE O/P EST LOW 20 MIN: CPT | Performed by: NURSE PRACTITIONER

## 2024-11-20 RX ORDER — FENOFIBRATE 48 MG/1
TABLET, COATED ORAL
Qty: 90 TABLET | Refills: 3 | Status: SHIPPED | OUTPATIENT
Start: 2024-11-20

## 2025-02-27 ENCOUNTER — OFFICE VISIT (OUTPATIENT)
Dept: FAMILY MEDICINE CLINIC | Facility: CLINIC | Age: 40
End: 2025-02-27

## 2025-02-27 VITALS
HEART RATE: 72 BPM | DIASTOLIC BLOOD PRESSURE: 72 MMHG | SYSTOLIC BLOOD PRESSURE: 116 MMHG | BODY MASS INDEX: 32.43 KG/M2 | OXYGEN SATURATION: 97 % | HEIGHT: 67 IN | WEIGHT: 206.6 LBS

## 2025-02-27 DIAGNOSIS — E78.1 HYPERTRIGLYCERIDEMIA: ICD-10-CM

## 2025-02-27 DIAGNOSIS — E11.65 TYPE 2 DIABETES MELLITUS WITH HYPERGLYCEMIA, WITHOUT LONG-TERM CURRENT USE OF INSULIN: Primary | ICD-10-CM

## 2025-02-27 RX ORDER — FENOFIBRATE 48 MG/1
TABLET, COATED ORAL
Qty: 90 TABLET | Refills: 3 | Status: SHIPPED | OUTPATIENT
Start: 2025-02-27

## 2025-02-27 NOTE — PROGRESS NOTES
"Subjective   Ellie Piedra is a 40 y.o. female. Presents today for   Chief Complaint   Patient presents with    Diabetes    Annual Exam       History Of Present Illness Ellie Ashby is a 40 year old female presenting for 3 month follow up and Annual Physical Exam      Diabetes Mellitus Type II:  metformin - patient is not tolerating s/e of diarrhea  Hyperlipidema: Tricor    Care gaps:  Diabetic foot exam   diabetic eye exam  Hepatitis C screening  Annual physical exam  Pap smear  Mammogram    Hepatitis B  Pneumococcal vaccine  Tdap  Influenza  COVID    History of Present Illness      There is no problem list on file for this patient.      Social History     Socioeconomic History    Marital status:    Tobacco Use    Smoking status: Never    Smokeless tobacco: Never   Vaping Use    Vaping status: Never Used   Substance and Sexual Activity    Alcohol use: Never    Drug use: Defer    Sexual activity: Defer       No Known Allergies    Current Outpatient Medications on File Prior to Visit   Medication Sig Dispense Refill    metFORMIN (GLUCOPHAGE) 500 MG tablet Take 1 tablet by mouth 2 (Two) Times a Day With Meals. 60 tablet 3    [DISCONTINUED] fenofibrate (TRICOR) 48 MG tablet Take 1 tablet by mouth daily for high triglycerides 90 tablet 3    [DISCONTINUED] Prenatal Vit-Fe Fumarate-FA (prenatal vitamin 27-0.8) 27-0.8 MG tablet tablet Take  by mouth Daily. (Patient not taking: Reported on 11/18/2024)       No current facility-administered medications on file prior to visit.       Objective   Vitals:    02/27/25 0906   BP: 116/72   Pulse: 72   SpO2: 97%   Weight: 93.7 kg (206 lb 9.6 oz)   Height: 170 cm (66.93\")     Body mass index is 32.43 kg/m².    Physical Exam  Neck was examined.  Lungs were auscultated.  No tenderness in the abdomen.    Results         Procedures     Assessment & Plan   Diagnoses and all orders for this visit:    1. Type 2 diabetes mellitus with hyperglycemia, without long-term current " use of insulin (Primary)  -     empagliflozin (Jardiance) 25 MG tablet tablet; Take 1 tablet by mouth Daily.  Dispense: 30 tablet; Refill: 5  -     Hemoglobin A1c  -     CBC & Differential  -     Basic Metabolic Panel    2. Hypertriglyceridemia  -     fenofibrate (TRICOR) 48 MG tablet; Take 1 tablet by mouth daily for high triglycerides  Dispense: 90 tablet; Refill: 3  -     Lipid Panel         Assessment & Plan            Body mass index is 32.43 kg/m².    Discussed Care Gaps, ordered referrals and encouraged vaccination updates.       - Pt agrees with plan of care and denies further questions/concerns today  - This document is intended for medical expert use only. Persons  reading this document without medical staff guidance may result in misinterpretation and unintended morbidity     Go to the ER for any possible life-threatening symptoms such as chest pain or shortness of air.      Please allow 3-5 business days for recommendations based on new results      I personally spent time with this patient, preparing for the visit, reviewing tests, obtaining and/or reviewing a separately obtained history, performing a medically appropriate examination and/or evaluation, counseling and educating the patient/family/caregiver, ordering medications,  documenting information in the medical record and indepentently interpreting results.               Return in about 3 months (around 5/27/2025) for Next scheduled follow up.

## 2025-02-28 LAB
BUN SERPL-MCNC: 9 MG/DL (ref 6–20)
BUN/CREAT SERPL: 12 (ref 7–25)
CALCIUM SERPL-MCNC: 9.2 MG/DL (ref 8.6–10.5)
CHLORIDE SERPL-SCNC: 100 MMOL/L (ref 98–107)
CHOLEST SERPL-MCNC: 212 MG/DL (ref 0–200)
CO2 SERPL-SCNC: 23.3 MMOL/L (ref 22–29)
CREAT SERPL-MCNC: 0.75 MG/DL (ref 0.57–1)
DIFFERENTIAL COMMENT: NORMAL
EGFRCR SERPLBLD CKD-EPI 2021: 103.4 ML/MIN/1.73
ERYTHROCYTE [DISTWIDTH] IN BLOOD BY AUTOMATED COUNT: 15.2 % (ref 12.3–15.4)
GLUCOSE SERPL-MCNC: 277 MG/DL (ref 65–99)
HBA1C MFR BLD: 12 % (ref 4.8–5.6)
HCT VFR BLD AUTO: 40.4 % (ref 34–46.6)
HDLC SERPL-MCNC: 30 MG/DL (ref 40–60)
HGB BLD-MCNC: 12.1 G/DL (ref 12–15.9)
LDLC SERPL CALC-MCNC: 90 MG/DL (ref 0–100)
LYMPHOCYTES # BLD MANUAL: 2.56 10*3/MM3 (ref 0.7–3.1)
LYMPHOCYTES NFR BLD MANUAL: 26.5 % (ref 19.6–45.3)
MCH RBC QN AUTO: 22.2 PG (ref 26.6–33)
MCHC RBC AUTO-ENTMCNC: 30 G/DL (ref 31.5–35.7)
MCV RBC AUTO: 74.1 FL (ref 79–97)
MONOCYTES # BLD MANUAL: 0.59 10*3/MM3 (ref 0.1–0.9)
MONOCYTES NFR BLD MANUAL: 6.1 % (ref 5–12)
NEUTROPHILS # BLD MANUAL: 6.51 10*3/MM3 (ref 1.7–7)
NEUTROPHILS NFR BLD MANUAL: 67.3 % (ref 42.7–76)
NRBC BLD AUTO-RTO: 0 /100 WBC (ref 0–0.2)
PLATELET # BLD AUTO: 379 10*3/MM3 (ref 140–450)
PLATELET BLD QL SMEAR: NORMAL
POTASSIUM SERPL-SCNC: 4.6 MMOL/L (ref 3.5–5.2)
RBC # BLD AUTO: 5.45 10*6/MM3 (ref 3.77–5.28)
RBC MORPH BLD: NORMAL
SODIUM SERPL-SCNC: 135 MMOL/L (ref 136–145)
TRIGL SERPL-MCNC: 558 MG/DL (ref 0–150)
VLDLC SERPL CALC-MCNC: 92 MG/DL (ref 5–40)
WBC # BLD AUTO: 9.67 10*3/MM3 (ref 3.4–10.8)

## 2025-03-02 DIAGNOSIS — E78.2 MIXED HYPERLIPIDEMIA: Primary | ICD-10-CM

## 2025-03-02 RX ORDER — ATORVASTATIN CALCIUM 20 MG/1
40 TABLET, FILM COATED ORAL DAILY
Qty: 180 TABLET | Refills: 3 | Status: SHIPPED | OUTPATIENT
Start: 2025-03-02

## 2025-03-03 NOTE — PROGRESS NOTES
Ellie your Hgba1C is 12.0 an increase of 0.8 from 3 months ago.  Your fasting glucose is 277, improved from 3 months ago.  Your sodium level remains at 135, try increasing your sodium intake one meal a day to normalize this.  Your total cholesterol is 212, your triglycerides are at 558, your HDL (good cholesterol) is low at 30 and your VLDL cholesterol is elevate at 92.  Im going to prescribe atorvastatin for you to take nightly to help normalize these levels.

## 2025-04-22 ENCOUNTER — HOSPITAL ENCOUNTER (EMERGENCY)
Facility: HOSPITAL | Age: 40
Discharge: HOME | End: 2025-04-23
Attending: EMERGENCY MEDICINE

## 2025-04-22 DIAGNOSIS — O20.0 THREATENED MISCARRIAGE: Primary | ICD-10-CM

## 2025-04-22 LAB
ALBUMIN SERPL-MCNC: 4.3 G/DL (ref 3.5–5.2)
ALBUMIN/GLOB SERPL: 1.5 G/DL
ALP SERPL-CCNC: 100 U/L (ref 39–117)
ALT SERPL W P-5'-P-CCNC: 17 U/L (ref 1–33)
ANION GAP SERPL CALCULATED.3IONS-SCNC: 10.9 MMOL/L (ref 5–15)
AST SERPL-CCNC: 14 U/L (ref 1–32)
BASOPHILS # BLD AUTO: 0.05 10*3/MM3 (ref 0–0.2)
BASOPHILS NFR BLD AUTO: 0.4 % (ref 0–1.5)
BILIRUB SERPL-MCNC: 0.2 MG/DL (ref 0–1.2)
BUN SERPL-MCNC: 10 MG/DL (ref 6–20)
BUN/CREAT SERPL: 14.1 (ref 7–25)
CALCIUM SPEC-SCNC: 9.3 MG/DL (ref 8.6–10.5)
CHLORIDE SERPL-SCNC: 101 MMOL/L (ref 98–107)
CO2 SERPL-SCNC: 22.1 MMOL/L (ref 22–29)
CREAT SERPL-MCNC: 0.71 MG/DL (ref 0.57–1)
DEPRECATED RDW RBC AUTO: 44.4 FL (ref 37–54)
EGFRCR SERPLBLD CKD-EPI 2021: 110.4 ML/MIN/1.73
EOSINOPHIL # BLD AUTO: 0.07 10*3/MM3 (ref 0–0.4)
EOSINOPHIL NFR BLD AUTO: 0.6 % (ref 0.3–6.2)
ERYTHROCYTE [DISTWIDTH] IN BLOOD BY AUTOMATED COUNT: 17.1 % (ref 12.3–15.4)
GLOBULIN UR ELPH-MCNC: 2.8 GM/DL
GLUCOSE SERPL-MCNC: 284 MG/DL (ref 65–99)
HCG INTACT+B SERPL-ACNC: 2684 MIU/ML
HCG SERPL QL: POSITIVE
HCT VFR BLD AUTO: 38.4 % (ref 34–46.6)
HGB BLD-MCNC: 11.6 G/DL (ref 12–15.9)
IMM GRANULOCYTES # BLD AUTO: 0.05 10*3/MM3 (ref 0–0.05)
IMM GRANULOCYTES NFR BLD AUTO: 0.4 % (ref 0–0.5)
LIPASE SERPL-CCNC: 22 U/L (ref 13–60)
LYMPHOCYTES # BLD AUTO: 3.14 10*3/MM3 (ref 0.7–3.1)
LYMPHOCYTES NFR BLD AUTO: 25.4 % (ref 19.6–45.3)
MCH RBC QN AUTO: 22.4 PG (ref 26.6–33)
MCHC RBC AUTO-ENTMCNC: 30.2 G/DL (ref 31.5–35.7)
MCV RBC AUTO: 74 FL (ref 79–97)
MONOCYTES # BLD AUTO: 0.81 10*3/MM3 (ref 0.1–0.9)
MONOCYTES NFR BLD AUTO: 6.5 % (ref 5–12)
NEUTROPHILS NFR BLD AUTO: 66.7 % (ref 42.7–76)
NEUTROPHILS NFR BLD AUTO: 8.25 10*3/MM3 (ref 1.7–7)
NRBC BLD AUTO-RTO: 0 /100 WBC (ref 0–0.2)
PLATELET # BLD AUTO: 281 10*3/MM3 (ref 140–450)
PMV BLD AUTO: 10.5 FL (ref 6–12)
POTASSIUM SERPL-SCNC: 3.9 MMOL/L (ref 3.5–5.2)
PROT SERPL-MCNC: 7.1 G/DL (ref 6–8.5)
RBC # BLD AUTO: 5.19 10*6/MM3 (ref 3.77–5.28)
SODIUM SERPL-SCNC: 134 MMOL/L (ref 136–145)
WBC NRBC COR # BLD AUTO: 12.37 10*3/MM3 (ref 3.4–10.8)

## 2025-04-22 PROCEDURE — 84702 CHORIONIC GONADOTROPIN TEST: CPT | Performed by: EMERGENCY MEDICINE

## 2025-04-22 PROCEDURE — 84703 CHORIONIC GONADOTROPIN ASSAY: CPT | Performed by: EMERGENCY MEDICINE

## 2025-04-22 PROCEDURE — 80053 COMPREHEN METABOLIC PANEL: CPT | Performed by: EMERGENCY MEDICINE

## 2025-04-22 PROCEDURE — 83690 ASSAY OF LIPASE: CPT | Performed by: EMERGENCY MEDICINE

## 2025-04-22 PROCEDURE — 85025 COMPLETE CBC W/AUTO DIFF WBC: CPT | Performed by: EMERGENCY MEDICINE

## 2025-04-22 PROCEDURE — 99284 EMERGENCY DEPT VISIT MOD MDM: CPT

## 2025-04-22 RX ORDER — SODIUM CHLORIDE 0.9 % (FLUSH) 0.9 %
10 SYRINGE (ML) INJECTION AS NEEDED
Status: DISCONTINUED | OUTPATIENT
Start: 2025-04-22 | End: 2025-04-23 | Stop reason: HOSPADM

## 2025-04-23 ENCOUNTER — APPOINTMENT (OUTPATIENT)
Dept: ULTRASOUND IMAGING | Facility: HOSPITAL | Age: 40
End: 2025-04-23

## 2025-04-23 VITALS
WEIGHT: 205.25 LBS | HEIGHT: 67 IN | HEART RATE: 76 BPM | BODY MASS INDEX: 32.21 KG/M2 | SYSTOLIC BLOOD PRESSURE: 114 MMHG | TEMPERATURE: 97.8 F | OXYGEN SATURATION: 98 % | RESPIRATION RATE: 16 BRPM | DIASTOLIC BLOOD PRESSURE: 65 MMHG

## 2025-04-23 PROCEDURE — 76817 TRANSVAGINAL US OBSTETRIC: CPT

## 2025-04-23 PROCEDURE — 76801 OB US < 14 WKS SINGLE FETUS: CPT

## 2025-04-23 PROCEDURE — 93976 VASCULAR STUDY: CPT

## 2025-04-23 NOTE — ED NOTES
Patient reports that she is pregnant, LMP 2/19/25. Patient states she had an ultrasound today at the [facility] in [place]. Patient states she began having vaginal bleeding with cramping and clots at 2000.

## 2025-04-23 NOTE — ED PROVIDER NOTES
"Subjective   History of Present Illness  40y/oPatient is a pregnant female at 7 weeks and 3 days gestation who presents with vaginal bleeding that began at 8:00 PM. She reports passing clots and tissue earlier, accompanied by cramping and pain. Her last menstrual period was on . She has had two prior pregnancies, one of which resulted in a loss. She has not yet seen the OBGYN to whom she was referred.  Review of Systems  See HPI  History reviewed. No pertinent past medical history.    No Known Allergies    History reviewed. No pertinent surgical history.    Family History   Problem Relation Age of Onset    No Known Problems Mother     Diabetes Father        Social History     Socioeconomic History    Marital status:    Tobacco Use    Smoking status: Never    Smokeless tobacco: Never   Vaping Use    Vaping status: Never Used   Substance and Sexual Activity    Alcohol use: Never    Drug use: Defer    Sexual activity: Defer           Objective   Physical Exam  Constitutional:  No acute distress.  Head:  Atraumatic.  Normocephalic.   Eyes:  No scleral icterus. Normal conjunctivae  ENT:  Moist mucosa.  No nasal discharge present.  Cardiovascular:  Well perfused.  Equal pulses.  Regular rate.  Normal capillary refill.    Pulmonary/Chest:  No respiratory distress.  Airway patent.  No tachypnea.  No accessory muscle usage.    Abdominal:  Nondistended. Nontender.   : Small amount of dark blood present in vagina without tissue or clots noted.  No significant tenderness on exam.  Extremities:  No peripheral edema.  No Deformity  Skin:  Warm, dry  Neurological:  Alert, awake, and appropriate.  Normal speech.      Procedures           ED Course      /65   Pulse 76   Temp 97.8 °F (36.6 °C) (Oral)   Resp 16   Ht 170.2 cm (67\")   Wt 93.1 kg (205 lb 4 oz)   LMP 2024 Comment:  A1  SpO2 98%   BMI 32.15 kg/m²   Labs Reviewed   COMPREHENSIVE METABOLIC PANEL - Abnormal; Notable for the " following components:       Result Value    Glucose 284 (*)     Sodium 134 (*)     All other components within normal limits    Narrative:     GFR Categories in Chronic Kidney Disease (CKD)      GFR Category          GFR (mL/min/1.73)    Interpretation  G1                     90 or greater         Normal or high (1)  G2                      60-89                Mild decrease (1)  G3a                   45-59                Mild to moderate decrease  G3b                   30-44                Moderate to severe decrease  G4                    15-29                Severe decrease  G5                    14 or less           Kidney failure          (1)In the absence of evidence of kidney disease, neither GFR category G1 or G2 fulfill the criteria for CKD.    eGFR calculation 2021 CKD-EPI creatinine equation, which does not include race as a factor   HCG, SERUM, QUALITATIVE - Abnormal; Notable for the following components:    HCG Qualitative Positive (*)     All other components within normal limits   CBC WITH AUTO DIFFERENTIAL - Abnormal; Notable for the following components:    WBC 12.37 (*)     Hemoglobin 11.6 (*)     MCV 74.0 (*)     MCH 22.4 (*)     MCHC 30.2 (*)     RDW 17.1 (*)     Neutrophils, Absolute 8.25 (*)     Lymphocytes, Absolute 3.14 (*)     All other components within normal limits   LIPASE - Normal   HCG, QUANTITATIVE, PREGNANCY    Narrative:     HCG Ranges by Gestational Age    Females - non-pregnant premenopausal   </= 1mIU/mL HCG  Females - postmenopausal               </= 7mIU/mL HCG    3 Weeks       5.4   -      72 mIU/mL  4 Weeks      10.2   -     708 mIU/mL  5 Weeks       217   -   8,245 mIU/mL  6 Weeks       152   -  32,177 mIU/mL  7 Weeks     4,059   - 153,767 mIU/mL  8 Weeks    31,366   - 149,094 mIU/mL  9 Weeks    59,109   - 135,901 mIU/mL  10 Weeks   44,186   - 170,409 mIU/mL  12 Weeks   27,107   - 201,615 mIU/mL  14 Weeks   24,302   -  93,646 mIU/mL  15 Weeks   12,540   -  69,747 mIU/mL  16  Weeks    8,904   -  55,332 mIU/mL  17 Weeks    8,240   -  51,793 mIU/mL  18 Weeks    9,649   -  55,271 mIU/mL     CBC AND DIFFERENTIAL    Narrative:     The following orders were created for panel order CBC & Differential.  Procedure                               Abnormality         Status                     ---------                               -----------         ------                     CBC Auto Differential[[id number]]        Abnormal            Final result               Scan Slide[[id number]]                                                                    Please view results for these tests on the individual orders.     Medications - No data to display    US Ob Transvaginal   Final Result   Impression:   Heterogeneous appearing endouterine complex structure could be products of conception. There is no gestational sac, yolk sac or well-formed embryo. Continued follow-up and serial beta hCG levels may be warranted.            Electronically Signed: Tae Smith MD     4/23/2025 1:35 AM EDT     Workstation ID: [id number]      US Ob < 14 Weeks Single or First Gestation   Final Result   Impression:   Heterogeneous appearing endouterine complex structure could be products of conception. There is no gestational sac, yolk sac or well-formed embryo. Continued follow-up and serial beta hCG levels may be warranted.            Electronically Signed: Tae Smith MD     4/23/2025 1:35 AM EDT     Workstation ID: [id number]                                                         Medical Decision Making  Problems Addressed:  Threatened miscarriage: complicated acute illness or injury    Amount and/or Complexity of Data Reviewed  Labs: ordered.  Radiology: ordered.    Risk  Prescription drug management.    Hemoglobin stable.  Reportedly with IUP on ultrasound earlier today.  No IUP on current ultrasound but does have heterogenous fluid collection that likely products of conception.  Suspect patient in process of having a  miscarriage.  States she can follow-up with OB/GYN.  Discussed need to be seen next 2 to 3 days for repeat quant and possibly repeat ultrasound.  Hemodynamically stable here.  Minimal bleeding on current exam.  Will DC.    Final diagnoses:   Threatened miscarriage       ED Disposition  ED Disposition       ED Disposition   Discharge    Condition   Stable    Comment   --               Your OBGYN in 2-3 days.    In 2 days  Your ultrasound was highly concerning for a miscarriage.  You need to have a repeat pregnancy hormone level and likely need a repeat ultrasound in the next 2 to 3 days.         Medication List      No changes were made to your prescriptions during this visit.            Ghulam Painter MD  04/23/25 [id number]

## 2025-05-20 DIAGNOSIS — E11.65 TYPE 2 DIABETES MELLITUS WITH HYPERGLYCEMIA, WITHOUT LONG-TERM CURRENT USE OF INSULIN: ICD-10-CM
